# Patient Record
Sex: MALE | Race: WHITE
[De-identification: names, ages, dates, MRNs, and addresses within clinical notes are randomized per-mention and may not be internally consistent; named-entity substitution may affect disease eponyms.]

---

## 2020-05-18 NOTE — REP
Clinical:  Cough .

 

Comparison:  None .

 

Findings:

The mediastinum and cardiac silhouette are stable and within normal limits for

portable technique.  The lung fields are clear without acute consolidation,

effusion, or pneumothorax.  Skeletal structures are intact.

 

Impression:

No acute cardiopulmonary process appreciated.

 

 

Electronically Signed by

Ritesh Sumner MD 05/18/2020 02:20 A

## 2021-07-20 ENCOUNTER — HOSPITAL ENCOUNTER (EMERGENCY)
Dept: HOSPITAL 53 - M ED | Age: 21
Discharge: HOME | End: 2021-07-20
Payer: COMMERCIAL

## 2021-07-20 VITALS — SYSTOLIC BLOOD PRESSURE: 148 MMHG | DIASTOLIC BLOOD PRESSURE: 91 MMHG

## 2021-07-20 VITALS — HEIGHT: 74 IN | BODY MASS INDEX: 26.62 KG/M2 | WEIGHT: 207.43 LBS
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
## 2021-07-20 DIAGNOSIS — F17.200: ICD-10-CM
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
## 2021-07-20 DIAGNOSIS — S62.665A: Primary | ICD-10-CM
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
## 2021-07-20 NOTE — REP
INDICATION:

crush injury to 4th finger.



COMPARISON:

None.



TECHNIQUE:

Four views of the left ring finger.



FINDINGS:

Four views of the left ring finger demonstrate a crush type fracture of the distal

tuft with associated soft tissue swelling.  No displacement.  No opaque foreign body..

.  .





IMPRESSION:

Crush-type fracture distal tuft left ring finger..







<Electronically signed by Christiano Astorga > 07/20/21 1100

## 2022-03-16 ENCOUNTER — HOSPITAL ENCOUNTER (INPATIENT)
Dept: HOSPITAL 53 - M ED | Age: 22
LOS: 1 days | Discharge: HOME | DRG: 885 | End: 2022-03-17
Attending: STUDENT IN AN ORGANIZED HEALTH CARE EDUCATION/TRAINING PROGRAM | Admitting: STUDENT IN AN ORGANIZED HEALTH CARE EDUCATION/TRAINING PROGRAM
Payer: COMMERCIAL

## 2022-03-16 VITALS — HEIGHT: 74 IN | BODY MASS INDEX: 27.26 KG/M2 | WEIGHT: 212.44 LBS

## 2022-03-16 VITALS — SYSTOLIC BLOOD PRESSURE: 156 MMHG | DIASTOLIC BLOOD PRESSURE: 82 MMHG

## 2022-03-16 DIAGNOSIS — Z91.52: ICD-10-CM

## 2022-03-16 DIAGNOSIS — F32.0: Primary | ICD-10-CM

## 2022-03-16 DIAGNOSIS — Z60.8: ICD-10-CM

## 2022-03-16 DIAGNOSIS — F41.8: ICD-10-CM

## 2022-03-16 DIAGNOSIS — Z20.822: ICD-10-CM

## 2022-03-16 DIAGNOSIS — F17.290: ICD-10-CM

## 2022-03-16 DIAGNOSIS — R45.851: ICD-10-CM

## 2022-03-16 DIAGNOSIS — Z63.4: ICD-10-CM

## 2022-03-16 DIAGNOSIS — Z79.899: ICD-10-CM

## 2022-03-16 LAB
ALBUMIN SERPL BCG-MCNC: 4.5 GM/DL (ref 3.2–5.2)
ALT SERPL W P-5'-P-CCNC: 34 U/L (ref 12–78)
AMPHETAMINES UR QL SCN: NEGATIVE
APAP SERPL-MCNC: < 2 UG/ML (ref 10–30)
BARBITURATES UR QL SCN: NEGATIVE
BENZODIAZ UR QL SCN: NEGATIVE
BILIRUB CONJ SERPL-MCNC: 0.1 MG/DL (ref 0–0.2)
BILIRUB SERPL-MCNC: 0.3 MG/DL (ref 0.2–1)
BUN SERPL-MCNC: 12 MG/DL (ref 7–18)
BZE UR QL SCN: NEGATIVE
CALCIUM SERPL-MCNC: 9.3 MG/DL (ref 8.5–10.1)
CANNABINOIDS UR QL SCN: NEGATIVE
CHLORIDE SERPL-SCNC: 104 MEQ/L (ref 98–107)
CO2 SERPL-SCNC: 32 MEQ/L (ref 21–32)
CREAT SERPL-MCNC: 0.92 MG/DL (ref 0.7–1.3)
ETHANOL SERPL-MCNC: < 0.003 % (ref 0–0.01)
GFR SERPL CREATININE-BSD FRML MDRD: > 60 ML/MIN/{1.73_M2} (ref 60–?)
GLUCOSE SERPL-MCNC: 86 MG/DL (ref 70–100)
HCT VFR BLD AUTO: 46.8 % (ref 42–52)
HGB BLD-MCNC: 15.6 G/DL (ref 13.5–17.5)
MCH RBC QN AUTO: 28 PG (ref 27–33)
MCHC RBC AUTO-ENTMCNC: 33.3 G/DL (ref 32–36.5)
MCV RBC AUTO: 83.9 FL (ref 80–96)
METHADONE UR QL SCN: NEGATIVE
OPIATES UR QL SCN: NEGATIVE
PCP UR QL SCN: NEGATIVE
PLATELET # BLD AUTO: 238 10^3/UL (ref 150–450)
POTASSIUM SERPL-SCNC: 3.9 MEQ/L (ref 3.5–5.1)
PROT SERPL-MCNC: 7.6 GM/DL (ref 6.4–8.2)
RBC # BLD AUTO: 5.58 10^6/UL (ref 4.3–6.1)
RSV RNA NPH QL NAA+PROBE: NEGATIVE
SALICYLATES SERPL-MCNC: < 1.7 MG/DL (ref 5–30)
SODIUM SERPL-SCNC: 141 MEQ/L (ref 136–145)
TSH SERPL DL<=0.005 MIU/L-ACNC: 0.67 UIU/ML (ref 0.36–3.74)
WBC # BLD AUTO: 5.5 10^3/UL (ref 4–10)

## 2022-03-16 RX ADMIN — ACETAMINOPHEN PRN MG: 325 TABLET ORAL at 13:20

## 2022-03-16 SDOH — SOCIAL STABILITY - SOCIAL INSECURITY: DISSAPEARANCE AND DEATH OF FAMILY MEMBER: Z63.4

## 2022-03-16 SDOH — SOCIAL STABILITY - SOCIAL INSECURITY: OTHER PROBLEMS RELATED TO SOCIAL ENVIRONMENT: Z60.8

## 2022-03-17 VITALS — SYSTOLIC BLOOD PRESSURE: 126 MMHG | DIASTOLIC BLOOD PRESSURE: 78 MMHG

## 2022-03-17 RX ADMIN — ACETAMINOPHEN PRN MG: 325 TABLET ORAL at 06:18
